# Patient Record
Sex: FEMALE | Race: ASIAN
[De-identification: names, ages, dates, MRNs, and addresses within clinical notes are randomized per-mention and may not be internally consistent; named-entity substitution may affect disease eponyms.]

---

## 2020-04-09 ENCOUNTER — HOSPITAL ENCOUNTER (EMERGENCY)
Dept: HOSPITAL 72 - EMR | Age: 30
Discharge: HOME | End: 2020-04-09
Payer: COMMERCIAL

## 2020-04-09 VITALS — BODY MASS INDEX: 26.58 KG/M2 | WEIGHT: 150 LBS | HEIGHT: 63 IN

## 2020-04-09 VITALS — SYSTOLIC BLOOD PRESSURE: 151 MMHG | DIASTOLIC BLOOD PRESSURE: 109 MMHG

## 2020-04-09 DIAGNOSIS — G43.909: Primary | ICD-10-CM

## 2020-04-09 DIAGNOSIS — R11.0: ICD-10-CM

## 2020-04-09 PROCEDURE — 99284 EMERGENCY DEPT VISIT MOD MDM: CPT

## 2020-04-09 PROCEDURE — 96375 TX/PRO/DX INJ NEW DRUG ADDON: CPT

## 2020-04-09 PROCEDURE — 96374 THER/PROPH/DIAG INJ IV PUSH: CPT

## 2020-04-09 PROCEDURE — 96361 HYDRATE IV INFUSION ADD-ON: CPT

## 2020-04-09 NOTE — NUR
ED Nurse Note:

pT AMBULATED TO ED FROM HOME C/O 10/10 HEADACHE LASTING SINCE FRIDAY, PT TRIED 
IBUPROFEN TO NO RELIEF, WAS SEEN YESTERDAY AT URGENT CARE FOR SAME REASON, NO 
RELIEF. vss, pT IS A&OX4, ERMD AT BEDSIDE

## 2020-04-09 NOTE — EMERGENCY ROOM REPORT
History of Present Illness


General


Chief Complaint:  Headache


Source:  Patient





Present Illness


HPI


Disclaimer: Please note that this report is being documented using DRAGON 

technology. This can lead to erroneous entry secondary to incorrect 

interpretation by the dictating instrument.





HPI: 29-year-old female history of migraines presents for evaluation of 

headache.  Complains of throbbing 10/10 headache over the past few days.  

Trying to control at home with ibuprofen and was seen at urgent care today 

where she received a drip of some sort.  She does not know what medication was 

and that drip.  Resolved her headache for a brief time but is now returning.  

Throbbing around the eyes and the temples mostly.  Consistent with prior 

headaches though this is lasting longer than usual.  She was vomiting yesterday 

but reports only nausea without vomiting today.  Denies possibility of 

pregnancy - tested negative earlier at urgent care.  Otherwise denies fever, 

chills, neck pain or stiffness, chest pain, shortness of breath, abdominal pain

, diarrhea, cough.


 


PMH: Migraine headaches


 


PSH: Denies


 


Allergies: Denies


 


Social Hx: Denies


Allergies:  


Coded Allergies:  


     No Known Allergies (Unverified , 20)





COVID-19 Screening


Contact w/high risk pt:  No


Recent Travel to affected area:  No


Experienced COVID-19 symptoms?:  No





Patient History


Last Menstrual Period:  20


Pregnant Now:  No


:  0


Para:  0





Nursing Documentation-PMH


Past Medical History:  No Stated History





Review of Systems


All Other Systems:  negative except mentioned in HPI





Physical Exam





Vital Signs








  Date Time  Temp Pulse Resp B/P (MAP) Pulse Ox O2 Delivery O2 Flow Rate FiO2


 


20 02:58 98.2 80 16 151/109 (123) 94 Room Air  





 





General: Awake and alert, no acute distress


HEENT: NC/AT. EOMI. PERRLA.  Visual fields are full.  No nystagmus.  Facial 

expressions are symmetrical.  No facial droop.


Cardiovascular: RRR.  S1 and S2 normal.  No murmur appreciated


Resp: Normal work of breathing. No cough, wheezing or crackles appreciated


Abdomen: Abdomen is soft, nondistended.  Nontender


Skin: Intact.  No abrasions, laceration or rash over the exposed skin


MSK: Normal tone and bulk. Moving all extremities.  No obvious deformity.  

There is no drift in the upper or lower extremities bilaterally.


Neuro: Awake and alert.  Mentating appropriately.  Facial expression 

symmetrical.  No dysarthria.  Sensation to light touch is intact over the upper 

and lower extremities.  The patient has intact speech with good repetition, 

comprehension.  Fund of knowledge is full.  No aphasia, no neglect.


NIH: 0





Medical Decision Making


Diagnostic Impression:  


 Primary Impression:  


 Migraine


ER Course


29-year-old female presenting for evaluation of headache.  Differential 

includes but is not limited to generalized headache, migraine headache, tension 

headache, cluster headache, giant cell arteritis, vasculitis, meningitis to 

name a few.  Patient is nontoxic-appearing, very little concern for 

intracranial process or infectious process.  Given her history of migraines and 

its typical presentation believe this is a migraine headache.  Attempted a 

sphenopalatine block with lidocaine the patient did have some response though 

required additional treatment with IV fluids, Toradol, Reglan and Benadryl.  

Her headache is now significantly improved.  Currently a 4/10 and continues to 

get better.  Do not believe she requires emergent imaging at this time.  Will 

prescribe Imitrex and continue Excedrin.  She will follow-up with her PMD on an 

outpatient basis.  I recommended referral to neurology as she gets these 

headaches regularly.  Patient is well-appearing and stable for outpatient follow

-up.  Discussed reasons to return to the emergency department.  She understands 

and agrees the treatment plan.





Last Vital Signs








  Date Time  Temp Pulse Resp B/P (MAP) Pulse Ox O2 Delivery O2 Flow Rate FiO2


 


20 02:58 98.2 80 16 151/109 (123) 94 Room Air  








Disposition:  HOME, SELF-CARE


Condition:  Improved


Scripts


Sumatriptan Succinate* (IMITREX*) 50 Mg Tablet


50 MG ORAL DAILY PRN MIGRAINE, #10 TAB


   Prov: Derick Belle MD         20 


Aspirin/Acetaminophen/Caffeine (EXCEDRIN MIGRAINE CAPLET) 1 Each Tablet


1 EACH PO TID for 10 Days, #30 TAB


   Prov: Derick Belle MD         20











Derick Belle MD 2020 03:11

## 2020-04-11 ENCOUNTER — HOSPITAL ENCOUNTER (EMERGENCY)
Dept: HOSPITAL 72 - EMR | Age: 30
Discharge: HOME | End: 2020-04-11
Payer: COMMERCIAL

## 2020-04-11 VITALS — HEIGHT: 63 IN | WEIGHT: 150 LBS | BODY MASS INDEX: 26.58 KG/M2

## 2020-04-11 VITALS — SYSTOLIC BLOOD PRESSURE: 138 MMHG | DIASTOLIC BLOOD PRESSURE: 70 MMHG

## 2020-04-11 VITALS — DIASTOLIC BLOOD PRESSURE: 94 MMHG | SYSTOLIC BLOOD PRESSURE: 140 MMHG

## 2020-04-11 DIAGNOSIS — G43.909: Primary | ICD-10-CM

## 2020-04-11 PROCEDURE — 99284 EMERGENCY DEPT VISIT MOD MDM: CPT

## 2020-04-11 PROCEDURE — 96375 TX/PRO/DX INJ NEW DRUG ADDON: CPT

## 2020-04-11 PROCEDURE — 70450 CT HEAD/BRAIN W/O DYE: CPT

## 2020-04-11 PROCEDURE — 96374 THER/PROPH/DIAG INJ IV PUSH: CPT

## 2020-04-11 PROCEDURE — 96361 HYDRATE IV INFUSION ADD-ON: CPT

## 2020-04-11 NOTE — EMERGENCY ROOM REPORT
History of Present Illness


General


Chief Complaint:  Headache


Source:  Patient





Present Illness


HPI


29-year-old female presents ED for headache.  Started a few days ago.  Frontal, 

throbbing, 8 out of 10, nonradiating.  Notes pulsing sensation behind both 

eyes.  Denies nausea or vomiting.  Denies fevers or chills.  Denies neck 

stiffness.  Was seen in urgent care a few days ago.  Was seen yesterday in ED.  

Was treated and subsequently discharged.  Patient was offered option for CT 

yesterday but she declined.  States that her headache is returning.  States 

that her mother has a history of chronic migraines.  Denies sick contacts or 

recent travel.  Denies fevers or chills.  Denies cough or congestion.  No other 

aggravating relieving factors.  Denies any other associated symptoms


Allergies:  


Coded Allergies:  


     No Known Allergies (Unverified , 4/9/20)





COVID-19 Screening


Contact w/high risk pt:  No


Recent Travel to affected area:  No


Experienced COVID-19 symptoms?:  No





Patient History


Past Medical History:  none


Past Surgical History:  none


Pertinent Family History:  none


Social History:  Denies: smoking, alcohol use, drug use


Last Menstrual Period:  3/5/20


Pregnant Now:  No


Immunizations:  UTD


Reviewed Nursing Documentation:  PMH: Agreed; PSxH: Agreed





Nursing Documentation-PMH


Past Medical History:  No History, Except For





Review of Systems


All Other Systems:  negative except mentioned in HPI





Physical Exam





Vital Signs








  Date Time  Temp Pulse Resp B/P (MAP) Pulse Ox O2 Delivery O2 Flow Rate FiO2


 


4/11/20 09:09 98.1 86 19 140/94 (109) 95 Room Air  








Sp02 EP Interpretation:  reviewed, normal


General Appearance:  no apparent distress, alert, GCS 15, non-toxic


Head:  normocephalic, atraumatic


Eyes:  bilateral eye normal inspection, bilateral eye PERRL


ENT:  hearing grossly normal, normal pharynx, no angioedema, normal voice


Neck:  full range of motion, no meningismus, supple/symm/no masses


Respiratory:  chest non-tender, lungs clear, normal breath sounds, speaking 

full sentences


Cardiovascular #1:  regular rate, rhythm, no edema


Cardiovascular #2:  2+ carotid (R), 2+ carotid (L), 2+ radial (R), 2+ radial (L)

, 2+ dorsalis pedis (R), 2+ dorsalis pedis (L)


Gastrointestinal:  normal bowel sounds, non tender, soft, non-distended, no 

guarding, no rebound


Rectal:  deferred


Genitourinary:  normal inspection, no CVA tenderness


Musculoskeletal:  back normal, normal range of motion, gait/station normal, non-

tender


Neurologic:  alert, motor strength/tone normal, CNs III-XII nml as tested, 

oriented x3, sensory intact, cerebellar normal, responsive, speech normal


Psychiatric:  judgement/insight normal, memory normal, mood/affect normal, no 

suicidal/homicidal ideation


Reflexes:  3+ bicep (R), 3+ bicep (L), 3+ tricep (R), 3+ tricep (L), 3+ knee (R)

, 3+ knee (L)


Lymphatic:  no adenopathy





Medical Decision Making


Diagnostic Impression:  


 Primary Impression:  


 Migraine


 Qualified Codes:  G43.909 - Migraine, unspecified, not intractable, without 

status migrainosus


ER Course


Hospital Course 


29-year-old female presents with persistent headaches.  Seen here 2 days ago 

for headache





Differential diagnoses include: tension headache, migraine, dehydration, 

intracranial bleed





Clinical course


Patient placed on stretcher.  After initial history and physical I ordered IV 

fluids, Reglan, Toradol and CT head





CT head negative.  On reassessment pain improved.  No focal deficits.  Family 

history of migraine.  Exam consistent with migraine.  Will discharge home.  She 

does not have a PMD.  I will provide referrals patient would benefit from 

outpatient neurology evaluation





Upon reassessment patient states pain has improved.  Patient feels better 

wishes to go home.  Given the lack of fever, nuchal rigidity or neurological 

findings my suspicion for intracranial pathology is low patient be safely 

discharged to home.





i.  I feel this is a highly complex case requiring extensive working including 

EKG/Rhythm strip, Xray/CT/US, Blood/urine lab work, repeat exams while in ED, 

and administration of strong opiates/narcotics for pain control, admission to 

hospital or close patient follow up.  





Diagnosis - migraine





stable and discharged to home with Rx Reglan.  f/up with PMD/neurology. return 

to ED if symptoms recur/worsen.


CT/MRI/US Diagnostic Results


CT/MRI/US Diagnostic Results :  


   Imaging Test Ordered:  CT head


   Impression


EXAM:


  CT Head Without Intravenous Contrast


 


CLINICAL HISTORY:


  H/A


 


TECHNIQUE:


  Axial computed tomography images of the head/brain without intravenous 


contrast.  CTDI is 53.4 mGy and DLP is 1018.8 mGy-cm.  One or more of the 


following dose reduction techniques were used: automated exposure control,


 adjustment of the mA and/or kV according to patient size, use of 


iterative reconstruction technique.


 


COMPARISON:


  No relevant prior studies available.


 


FINDINGS:


  Brain:  Unremarkable.  No hemorrhage.  No significant white matter 


disease.  No edema.


  Ventricles:  Unremarkable.  No ventriculomegaly.


  Bones/joints:  Unremarkable.  No acute fracture.


  Soft tissues:  Unremarkable.


  Sinuses:  Unremarkable as visualized.  No acute sinusitis.


  Mastoid air cells:  Unremarkable as visualized.  No mastoid effusion.


 


IMPRESSION:     


  Normal head/brain CT.





Last Vital Signs








  Date Time  Temp Pulse Resp B/P (MAP) Pulse Ox O2 Delivery O2 Flow Rate FiO2


 


4/11/20 09:09 98.1 86 19 140/94 (109) 95 Room Air  








Status:  improved


Disposition:  HOME, SELF-CARE


Condition:  Stable


Scripts


Metoclopramide Hcl* (REGLAN*) 10 Mg Tablet


10 MG ORAL THREE TIMES A DAY, #20 TAB


   Prov: Gene Schaffer MD         4/11/20


Referrals:  


NOT CHOSEN [facility]/MD,REFERRING (PCP)











Gene Schaffer MD Apr 11, 2020 09:38

## 2020-04-11 NOTE — NUR
ED Nurse Note:



Pt ambulated to ED from home. per pt., she was seen by Dr. Belle on 4/9 told 
her that she can get CT head for her headaches if it is getting worse Pt. 
cannot get a referral for a neurology  b/c she does not have a pcp. VSS, 
afebrile on triage. Placed on bed.

## 2020-04-11 NOTE — DIAGNOSTIC IMAGING REPORT
EXAM:

  CT Head Without Intravenous Contrast

 

CLINICAL HISTORY:

  H/A

 

TECHNIQUE:

  Axial computed tomography images of the head/brain without intravenous 

contrast.  CTDI is 53.4 mGy and DLP is 1018.8 mGy-cm.  One or more of the 

following dose reduction techniques were used: automated exposure control,

 adjustment of the mA and/or kV according to patient size, use of 

iterative reconstruction technique.

 

COMPARISON:

  No relevant prior studies available.

 

FINDINGS:

  Brain:  Unremarkable.  No hemorrhage.  No significant white matter 

disease.  No edema.

  Ventricles:  Unremarkable.  No ventriculomegaly.

  Bones/joints:  Unremarkable.  No acute fracture.

  Soft tissues:  Unremarkable.

  Sinuses:  Unremarkable as visualized.  No acute sinusitis.

  Mastoid air cells:  Unremarkable as visualized.  No mastoid effusion.

 

IMPRESSION:     

  Normal head/brain CT.